# Patient Record
Sex: FEMALE | Race: WHITE
[De-identification: names, ages, dates, MRNs, and addresses within clinical notes are randomized per-mention and may not be internally consistent; named-entity substitution may affect disease eponyms.]

---

## 2018-12-05 NOTE — HP
DATE OF SURGERY:  12/06/2018

 

ADMISSION DIAGNOSIS:  Right inguinal hernia.



ANTICIPATED PROCEDURE:  Repair. 



HISTORY OF PRESENT ILLNESS: Patient presents with symptomatic right inguinal hernia 
presents for repair. 

 

PAST MEDICAL HISTORY: 

ALLERGIES:  SULFA, MORPHINE.

MEDICATIONS: Multiple. 



PAST SURGICAL HISTORY: Appendectomy. Cholecystectomy. 



SOCIAL HISTORY: Negative.  

FAMILY HISTORY: Negative.



REVIEW OF SYSTEMS: Chronic leukemia. Interstitial cystitis. 



PHYSICAL EXAMINATION:  

VITAL SIGNS: Normal.

CHEST:  Clear.

COR: Regular.

ABDOMEN: Right inguinal hernia.  



IMPRESSION: Right inguinal hernia.



PLAN: Repair.

## 2018-12-06 ENCOUNTER — HOSPITAL ENCOUNTER (OUTPATIENT)
Dept: HOSPITAL 33 - SDC | Age: 73
Discharge: HOME | End: 2018-12-06
Attending: SURGERY
Payer: MEDICARE

## 2018-12-06 VITALS — HEART RATE: 68 BPM | SYSTOLIC BLOOD PRESSURE: 119 MMHG | DIASTOLIC BLOOD PRESSURE: 63 MMHG

## 2018-12-06 VITALS — OXYGEN SATURATION: 97 %

## 2018-12-06 DIAGNOSIS — K41.90: Primary | ICD-10-CM

## 2018-12-06 PROCEDURE — 99140 ANES COMP EMERGENCY COND: CPT

## 2018-12-06 PROCEDURE — 94250: CPT

## 2018-12-06 PROCEDURE — 00830 ANES HERNIA RPR LWR ABD NOS: CPT

## 2018-12-06 PROCEDURE — 88302 TISSUE EXAM BY PATHOLOGIST: CPT

## 2018-12-06 PROCEDURE — 49550 RPR REM HERNIA INIT REDUCE: CPT

## 2018-12-07 NOTE — OP
SURGERY DATE/TIME:    12/06/2018  1229 



PREOPERATIVE DIAGNOSIS:     Right femoral hernia. 



POSTOPERATIVE DIAGNOSIS:  Right inguinal hernia. 



PROCEDURE:    Right inguinal hernia with mesh. 



SURGEON:        Aneesh Modi M.D.



ANESTHESIA:    General.



COMPLICATIONS:    None.



CONDITION:        Stable.



INDICATION:  A patient with symptomatic hernia. 



DESCRIPTION OF PROCEDURE:  Taken to surgery. General anesthetic. Routine prep and drape. 
It was felt to be femoral. Small femoral incision did not expose anything. A right 
inguinal incision was made and a right indirect inguinal hernia was present. This was 
secured with suture #0 Prolene and then a 1 x 4 mesh was placed and totally sewn into 
place from the symphysis upwards in Maverick's ligament-type fashion and to the conjoin 
tendon. Good repair was present. Fascia closed with 0 Vicryl. Daniella fascia closed with 
3-0 Vicryl. Skin closed with 4-0 Vicryl. Steri-Strips applied. Sterile dressing applied. 
Femoral incision closed with similarly. The patient tolerated the procedure 
satisfactorily.